# Patient Record
Sex: FEMALE | Race: ASIAN | Employment: OTHER | URBAN - METROPOLITAN AREA
[De-identification: names, ages, dates, MRNs, and addresses within clinical notes are randomized per-mention and may not be internally consistent; named-entity substitution may affect disease eponyms.]

---

## 2018-03-17 ENCOUNTER — HOSPITAL ENCOUNTER (OUTPATIENT)
Dept: MAMMOGRAPHY | Age: 68
Discharge: HOME | End: 2018-03-17
Attending: FAMILY MEDICINE
Payer: COMMERCIAL

## 2018-03-17 DIAGNOSIS — Z12.31 ENCOUNTER FOR SCREENING MAMMOGRAM FOR HIGH-RISK PATIENT: ICD-10-CM

## 2018-03-17 PROCEDURE — 77067 SCR MAMMO BI INCL CAD: CPT

## 2018-06-05 ENCOUNTER — HOSPITAL ENCOUNTER (OUTPATIENT)
Dept: GENERAL RADIOLOGY | Age: 68
Discharge: HOME | End: 2018-06-05
Payer: COMMERCIAL

## 2018-06-05 DIAGNOSIS — R05.3 CHRONIC COUGH: ICD-10-CM

## 2018-06-05 PROCEDURE — 71046 X-RAY EXAM CHEST 2 VIEWS: CPT

## 2018-06-25 ENCOUNTER — HOSPITAL ENCOUNTER (OUTPATIENT)
Dept: CT IMAGING | Age: 68
Discharge: HOME | End: 2018-06-25
Attending: PHYSICIAN ASSISTANT
Payer: COMMERCIAL

## 2018-06-25 DIAGNOSIS — R93.89 ABNORMAL CXR: ICD-10-CM

## 2018-06-25 DIAGNOSIS — R05.9 COUGH: ICD-10-CM

## 2018-06-25 PROCEDURE — 71250 CT THORAX DX C-: CPT

## 2018-08-13 ENCOUNTER — HOSPITAL ENCOUNTER (OUTPATIENT)
Dept: CT IMAGING | Age: 68
Discharge: HOME | End: 2018-08-13
Attending: OTOLARYNGOLOGY
Payer: COMMERCIAL

## 2018-08-13 VITALS — WEIGHT: 98 LBS | BODY MASS INDEX: 22.68 KG/M2 | HEIGHT: 55 IN

## 2018-08-13 DIAGNOSIS — J38.00 PARALYZED VOCAL CORDS: ICD-10-CM

## 2018-08-13 LAB — CREAT BLD-MCNC: 0.8 MG/DL (ref 0.8–1.5)

## 2018-08-13 PROCEDURE — 74011000258 HC RX REV CODE- 258: Performed by: OTOLARYNGOLOGY

## 2018-08-13 PROCEDURE — 82565 ASSAY OF CREATININE: CPT

## 2018-08-13 PROCEDURE — 74011636320 HC RX REV CODE- 636/320: Performed by: OTOLARYNGOLOGY

## 2018-08-13 PROCEDURE — 70491 CT SOFT TISSUE NECK W/DYE: CPT

## 2018-08-13 RX ORDER — SODIUM CHLORIDE 0.9 % (FLUSH) 0.9 %
10 SYRINGE (ML) INJECTION
Status: COMPLETED | OUTPATIENT
Start: 2018-08-13 | End: 2018-08-13

## 2018-08-13 RX ADMIN — Medication 10 ML: at 09:19

## 2018-08-13 RX ADMIN — SODIUM CHLORIDE 100 ML: 900 INJECTION, SOLUTION INTRAVENOUS at 09:19

## 2018-08-13 RX ADMIN — IOPAMIDOL 80 ML: 755 INJECTION, SOLUTION INTRAVENOUS at 09:18

## 2018-11-23 PROBLEM — R76.11 POSITIVE PPD, TREATED: Status: ACTIVE | Noted: 2018-11-23

## 2018-12-04 ENCOUNTER — HOSPITAL ENCOUNTER (OUTPATIENT)
Dept: CT IMAGING | Age: 68
Discharge: HOME | End: 2018-12-04
Attending: PHYSICIAN ASSISTANT
Payer: COMMERCIAL

## 2018-12-04 PROCEDURE — 71250 CT THORAX DX C-: CPT

## 2019-04-02 ENCOUNTER — HOSPITAL ENCOUNTER (OUTPATIENT)
Dept: MAMMOGRAPHY | Age: 69
Discharge: HOME | End: 2019-04-02
Attending: FAMILY MEDICINE
Payer: COMMERCIAL

## 2019-04-02 DIAGNOSIS — Z12.31 VISIT FOR SCREENING MAMMOGRAM: ICD-10-CM

## 2019-04-02 PROCEDURE — 77067 SCR MAMMO BI INCL CAD: CPT

## 2020-01-31 ENCOUNTER — HOSPITAL ENCOUNTER (OUTPATIENT)
Dept: CT IMAGING | Age: 70
Discharge: HOME | End: 2020-01-31
Attending: INTERNAL MEDICINE
Payer: COMMERCIAL

## 2020-01-31 DIAGNOSIS — R91.1 LUNG NODULE: ICD-10-CM

## 2020-01-31 PROCEDURE — 71250 CT THORAX DX C-: CPT

## 2020-09-16 ENCOUNTER — HOSPITAL ENCOUNTER (OUTPATIENT)
Dept: ULTRASOUND IMAGING | Age: 70
Discharge: HOME | End: 2020-09-16
Attending: NURSE PRACTITIONER
Payer: COMMERCIAL

## 2020-09-16 DIAGNOSIS — N39.0 RECURRENT UTI: ICD-10-CM

## 2020-09-16 PROCEDURE — 76770 US EXAM ABDO BACK WALL COMP: CPT

## 2021-06-23 ENCOUNTER — TRANSCRIBE ORDER (OUTPATIENT)
Dept: SCHEDULING | Age: 71
End: 2021-06-23

## 2021-06-23 DIAGNOSIS — Z12.31 SCREENING MAMMOGRAM, ENCOUNTER FOR: ICD-10-CM

## 2021-06-23 DIAGNOSIS — M81.0 OSTEOPOROSIS, UNSPECIFIED OSTEOPOROSIS TYPE, UNSPECIFIED PATHOLOGICAL FRACTURE PRESENCE: Primary | ICD-10-CM

## 2021-08-16 ENCOUNTER — HOSPITAL ENCOUNTER (OUTPATIENT)
Dept: MAMMOGRAPHY | Age: 71
Discharge: HOME | End: 2021-08-16
Attending: FAMILY MEDICINE
Payer: COMMERCIAL

## 2021-08-16 DIAGNOSIS — Z12.31 SCREENING MAMMOGRAM, ENCOUNTER FOR: ICD-10-CM

## 2021-08-16 DIAGNOSIS — M81.0 OSTEOPOROSIS, UNSPECIFIED OSTEOPOROSIS TYPE, UNSPECIFIED PATHOLOGICAL FRACTURE PRESENCE: ICD-10-CM

## 2021-08-16 PROCEDURE — 77080 DXA BONE DENSITY AXIAL: CPT

## 2021-08-16 PROCEDURE — 77067 SCR MAMMO BI INCL CAD: CPT

## 2022-03-18 PROBLEM — R76.11 POSITIVE PPD, TREATED: Status: ACTIVE | Noted: 2018-11-23

## 2022-06-15 DIAGNOSIS — J42 CHRONIC BRONCHITIS, UNSPECIFIED CHRONIC BRONCHITIS TYPE (HCC): Primary | ICD-10-CM

## 2022-06-20 ENCOUNTER — OFFICE VISIT (OUTPATIENT)
Dept: PULMONOLOGY | Age: 72
End: 2022-06-20
Payer: COMMERCIAL

## 2022-06-20 VITALS
TEMPERATURE: 97 F | HEART RATE: 73 BPM | HEIGHT: 59 IN | SYSTOLIC BLOOD PRESSURE: 142 MMHG | WEIGHT: 98 LBS | OXYGEN SATURATION: 98 % | DIASTOLIC BLOOD PRESSURE: 68 MMHG | BODY MASS INDEX: 19.76 KG/M2

## 2022-06-20 DIAGNOSIS — R09.82 POST-NASAL DRAINAGE: ICD-10-CM

## 2022-06-20 DIAGNOSIS — R05.9 COUGH: Primary | ICD-10-CM

## 2022-06-20 DIAGNOSIS — R91.8 PULMONARY NODULES: ICD-10-CM

## 2022-06-20 PROCEDURE — 1123F ACP DISCUSS/DSCN MKR DOCD: CPT | Performed by: INTERNAL MEDICINE

## 2022-06-20 PROCEDURE — 99213 OFFICE O/P EST LOW 20 MIN: CPT | Performed by: INTERNAL MEDICINE

## 2022-06-20 RX ORDER — LISINOPRIL 5 MG/1
5 TABLET ORAL DAILY
COMMUNITY

## 2022-06-20 ASSESSMENT — ENCOUNTER SYMPTOMS
DIARRHEA: 0
COUGH: 1
VOMITING: 0
ABDOMINAL PAIN: 0
CONSTIPATION: 0
WHEEZING: 0
NAUSEA: 0
CHEST TIGHTNESS: 0
SHORTNESS OF BREATH: 1

## 2022-06-20 NOTE — PROGRESS NOTES
[facility]: FOLLOW-UP Patient Office Visit Note  [address] Igor Guerra. [address] [facility], [address] [facility]  [phone]    Patient Name:  Abhinav Burnett  YOB: 1950            Date of Service:  6/20/2022    Chief Complaint   Patient presents with    Cough       History of Present Illness:  69-year old [place] female who is here today for follow-up of COPD and lung nodule and cough. She is here with her daughter and . Last visit was January 2020 with Dr. Alexandro Ritter. .  The patient has had latent TB followed by [facility] and had finished rifampin in 2019. She has had pulmonary nodules that are been stable. She also has had a chronic cough thought to be related to postnasal drainage. Multiple meds and maneuvers of been tried to help her with this cough. She does use Flonase and Singulair. She is on lisinopril and according to her daughter she has stopped it before and it did not make a difference with her cough. She has not had reflux. She stopped using Spiriva and according to her daughter her other inhalers been tried but were not beneficial.    According to the patient's daughters the patient had pneumonia in January. She was treated at urgent care. In April she had a cold and was tested for COVID and this was negative. She was treated with antibiotics and currently her cough is 80% improved.     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       Patient Active Problem List   Diagnosis    Visual disturbance    Allergic rhinitis    Positive PPD, treated    Dyslipidemia    Osteoporosis    Vertigo    Essential hypertension, benign         Past Surgical History:   Procedure Laterality Date    CATARACT REMOVAL Right 2016       Social History Socioeconomic History    Marital status:      Spouse name: Not on file    Number of children: Not on file    Years of education: Not on file    Highest education level: Not on file   Occupational History    Not on file   Tobacco Use    Smoking status: Never Smoker    Smokeless tobacco: Never Used   Substance and Sexual Activity    Alcohol use: No    Drug use: No    Sexual activity: Not on file   Other Topics Concern    Not on file   Social History Narrative    , lives with her . Pt is retired. 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 She moved to [facility] in [address] [place] [address]. She then worked in [address] [place] [address] until she retired. Social Determinants of Health     Financial Resource Strain:     Difficulty of Paying Living Expenses: Not on file   Food Insecurity:     Worried About Running Out of Food in the Last Year: Not on file    Rosie of Food in the Last Year: Not on file   Transportation Needs:     Lack of Transportation (Medical): Not on file    Lack of Transportation (Non-Medical):  Not on file   Physical Activity:     Days of Exercise per Week: Not on file    Minutes of Exercise per Session: Not on file   Stress:     Feeling of Stress : Not on file   Social Connections:     Frequency of Communication with Friends and Family: Not on file    Frequency of Social Gatherings with Friends and Family: Not on file    Attends [employer] Services: Not on file    Active Member of [address] [place] [address] or Organizations: Not on file    Attends Club or Organization Meetings: Not on file    Marital Status: Not on file   Intimate Partner Violence:     Fear of Current or Ex-Partner: Not on file    Emotionally Abused: Not on file    Physically Abused: Not on file    Sexually Abused: Not on file   Housing Stability:     Unable to Pay for Housing in the Last Year: Not on file    Number of [place] in the Last Year: Not on file    Unstable Housing in the Last Year: Not on file       Family History Problem Relation Age of Onset    Breast Cancer Neg Hx        Allergies   Allergen Reactions    Ace Inhibitors Other (See Comments)           Review of Systems   Constitutional: Positive for chills. Negative for fatigue, fever and unexpected weight change. Respiratory: Positive for cough and shortness of breath. Negative for chest tightness and wheezing. Cardiovascular: Negative for chest pain, palpitations and leg swelling. Gastrointestinal: Negative for abdominal pain, constipation, diarrhea, nausea and vomiting. Neurological: Negative for dizziness, tremors, seizures, weakness and headaches. OBJECTIVE:  Physical Exam:  Vitals:    06/20/22 0816   BP: (!) 142/68   Pulse: 73   Temp: 97 °F (36.1 °C)   SpO2: 98%        GENERAL APPEARANCE:   The patient is normal weight and in no respiratory distress. HEENT:   PERRL. Conjunctivae unremarkable. Nasal mucosa is without epistaxis, exudate, or polyps. Gums and dentition are unremarkable. There is no oropharyngeal narrowing. TMs are clear. NECK/LYMPHATIC:   Symmetrical with no elevation of jugular venous pulsation. Trachea midline. No thyroid enlargement. No cervical adenopathy. LUNGS:   Normal respiratory effort with symmetrical lung expansion. Breath sounds clear. HEART:   There is a regular rate and rhythm. No murmur, rub, or gallop. There is no edema in the lower extremities. ABDOMEN:   Soft and non-tender. No hepatosplenomegaly. Bowel sounds are normal.       NEURO:   The patient is alert and oriented to person, place, and time. Memory appears intact and mood is normal.  No gross sensorimotor deficits are present. DIAGNOSTIC TESTS:    CXR:    No results found for this or any previous visit from the past 365 days. CT WITHOUT CONTRAST:    No results found for this or any previous visit from the past 365 days.       CT WITH CONTRAST:    No results found for this or any previous visit from the past 12 days.        CT HIGH RES:    No results found for this or any previous visit from the past 365 days. CT PE PROTOCOL:    No results found for this or any previous visit from the past 365 days. LDCT SCREENING:    No results found for this or any previous visit from the past 365 days. PET SCAN:    No results found for this or any previous visit from the past 365 days. Spirometry:      No flowsheet data found. Exercise oximetry:          ASSESSMENT:   Diagnosis Orders   1. Cough     2. Post-nasal drainage     3. Pulmonary nodules         PLAN:  · Follow up in 1 year with cxr with Dr. Leo Greenberg  · Recommend getting off lisinopril per family doctor  · Continue flonase, atrovent nasal spray    No orders of the defined types were placed in this encounter. No orders of the defined types were placed in this encounter.         Electronically signed by  Glorianne Claude, MD

## 2023-07-07 DIAGNOSIS — R09.82 POST-NASAL DRAINAGE: Primary | ICD-10-CM

## 2023-07-07 DIAGNOSIS — R91.8 PULMONARY NODULES: ICD-10-CM

## 2023-07-07 DIAGNOSIS — R05.9 COUGH, UNSPECIFIED TYPE: ICD-10-CM

## 2023-07-10 ENCOUNTER — OFFICE VISIT (OUTPATIENT)
Dept: PULMONOLOGY | Age: 73
End: 2023-07-10
Payer: MEDICAID

## 2023-07-10 ENCOUNTER — HOSPITAL ENCOUNTER (OUTPATIENT)
Dept: GENERAL RADIOLOGY | Age: 73
Discharge: HOME | End: 2023-07-13
Payer: MEDICARE

## 2023-07-10 VITALS
HEART RATE: 74 BPM | WEIGHT: 98 LBS | RESPIRATION RATE: 14 BRPM | OXYGEN SATURATION: 98 % | SYSTOLIC BLOOD PRESSURE: 140 MMHG | DIASTOLIC BLOOD PRESSURE: 78 MMHG | HEIGHT: 59 IN | BODY MASS INDEX: 19.76 KG/M2

## 2023-07-10 DIAGNOSIS — R05.3 CHRONIC COUGH: Primary | ICD-10-CM

## 2023-07-10 DIAGNOSIS — R09.82 POST-NASAL DRAINAGE: ICD-10-CM

## 2023-07-10 DIAGNOSIS — R05.9 COUGH, UNSPECIFIED TYPE: ICD-10-CM

## 2023-07-10 DIAGNOSIS — R91.8 PULMONARY NODULES: ICD-10-CM

## 2023-07-10 PROCEDURE — 3078F DIAST BP <80 MM HG: CPT | Performed by: INTERNAL MEDICINE

## 2023-07-10 PROCEDURE — 99213 OFFICE O/P EST LOW 20 MIN: CPT | Performed by: INTERNAL MEDICINE

## 2023-07-10 PROCEDURE — 1123F ACP DISCUSS/DSCN MKR DOCD: CPT | Performed by: INTERNAL MEDICINE

## 2023-07-10 PROCEDURE — 3077F SYST BP >= 140 MM HG: CPT | Performed by: INTERNAL MEDICINE

## 2023-07-10 PROCEDURE — 71046 X-RAY EXAM CHEST 2 VIEWS: CPT

## 2023-07-10 NOTE — PROGRESS NOTES
[facility]: FOLLOW-UP Patient Office Visit Note  [address] Samantha Boyce. [address] [facility], [address] [place] [address]  [phone]    Patient Name:  Durga Peralta  YOB: 1950            Date of Service:  7/10/2023    Chief Complaint   Patient presents with    Cough       History of Present Illness:  71-year old [facility] female who is here today for follow-up of COPD and lung nodule and cough. She is here with her daughter and . Last visit was January 2020 with Dr. Deandra Lawton. .  The patient has had latent TB followed by [facility] and had finished rifampin in 2019. She has had pulmonary nodules that are been stable. She also has had a chronic cough thought to be related to postnasal drainage. Multiple meds and maneuvers of been tried to help her with this cough. She does use Flonase and Singulair and also has a Atrovent nasal spray. She is here today with her daughter and her last visit was June 2022. Daughter says that overall she is doing well. She does have some cough that is worse when she lays down at night. Since she was here last we had asked her to stop lisinopril which she did for period time but then it was started back and there was no change in her cough. She has used inhalers in the past and she says those never helped.     Past Medical History:   Diagnosis Date    Allergic rhinitis     Back pain     Dyslipidemia     Essential hypertension, benign     Menopause     Osteoporosis     dexa 10/14 t score -2.9,started fosamax 11/2014    Positive PPD 2018    treated rifampin       Patient Active Problem List   Diagnosis    Visual disturbance    Allergic rhinitis    Positive PPD, treated    Dyslipidemia    Osteoporosis    Vertigo    Essential hypertension, benign         Past Surgical History:   Procedure Laterality Date    CATARACT REMOVAL Right 2016       Social History     Socioeconomic History    Marital status:      Spouse name: Not on file    Number of
Anemia, unspecified type